# Patient Record
Sex: MALE | Race: BLACK OR AFRICAN AMERICAN | NOT HISPANIC OR LATINO | URBAN - METROPOLITAN AREA
[De-identification: names, ages, dates, MRNs, and addresses within clinical notes are randomized per-mention and may not be internally consistent; named-entity substitution may affect disease eponyms.]

---

## 2022-05-19 ENCOUNTER — EMERGENCY (EMERGENCY)
Facility: HOSPITAL | Age: 42
LOS: 1 days | Discharge: HOME | End: 2022-05-19
Attending: EMERGENCY MEDICINE
Payer: SELF-PAY

## 2022-05-19 VITALS
DIASTOLIC BLOOD PRESSURE: 79 MMHG | OXYGEN SATURATION: 99 % | SYSTOLIC BLOOD PRESSURE: 144 MMHG | HEART RATE: 97 BPM | RESPIRATION RATE: 18 BRPM | TEMPERATURE: 98 F

## 2022-05-19 VITALS
WEIGHT: 244.93 LBS | TEMPERATURE: 99 F | HEART RATE: 98 BPM | RESPIRATION RATE: 18 BRPM | HEIGHT: 70 IN | SYSTOLIC BLOOD PRESSURE: 156 MMHG | OXYGEN SATURATION: 96 % | DIASTOLIC BLOOD PRESSURE: 108 MMHG

## 2022-05-19 PROCEDURE — 73564 X-RAY EXAM KNEE 4 OR MORE: CPT

## 2022-05-19 PROCEDURE — 73564 X-RAY EXAM KNEE 4 OR MORE: CPT | Mod: 26,LT

## 2022-05-19 PROCEDURE — 99284 EMERGENCY DEPT VISIT MOD MDM: CPT

## 2022-05-19 PROCEDURE — 99283 EMERGENCY DEPT VISIT LOW MDM: CPT | Mod: 25

## 2022-05-19 RX ORDER — IBUPROFEN 200 MG
800 TABLET ORAL ONCE
Refills: 0 | Status: COMPLETED | OUTPATIENT
Start: 2022-05-19 | End: 2022-05-19

## 2022-05-19 RX ORDER — IBUPROFEN 200 MG
1 TABLET ORAL
Qty: 20 | Refills: 0
Start: 2022-05-19

## 2022-05-19 RX ADMIN — Medication 800 MILLIGRAM(S): at 12:32

## 2022-05-19 NOTE — ED PROVIDER NOTE - PATIENT PORTAL LINK FT
You can access the FollowMyHealth Patient Portal offered by [facility] by registering at the following website: [email or web address]. By joining [employer]’s FollowMyHealth portal, you will also be able to view your health information using other applications (apps) compatible with our system.
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
## 2022-05-19 NOTE — ED PROVIDER NOTE - NSFOLLOWUPINSTRUCTIONS_ED_ALL_ED_FT
Knee Sprain, Adult       A knee sprain is a stretch or tear in a knee ligament. Knee ligaments are tissues that connect bones in the knee to each other.      What are the causes?    This condition often results from:  •A fall.      •An injury to the knee.        What are the signs or symptoms?    Symptoms of this condition include:  •Trouble straightening or bending the leg.      •Swelling in the knee.      •Bruising around the knee.      •Tenderness or pain in the knee.      •Muscle spasms around the knee.        How is this diagnosed?    This condition may be diagnosed based on:  •A physical exam.      •A history of what happened just before you started to have symptoms.    •Tests, including:  •An X-ray. This may be done to make sure no bones are broken.      •An MRI. This may be done to check if the ligament is torn.      •Stress testing of the knee. This may be done to check ligament damage.          How is this treated?    Treatment for this condition may involve:  •Keeping the knee still (immobilized) with a cast, brace, or splint.      •Applying ice to the knee. This helps with pain and swelling.      •Raising (elevating) the knee above the level of your heart when you are resting. This helps with pain and swelling.      •Taking medicine for pain.      •Doing exercises to prevent or limit permanent weakness or stiffness in your knee.      •Having surgery to reconnect the ligament to the bone or to reconstruct it. This may be needed if the ligament is completely torn.        Follow these instructions at home:    If you have a splint or brace:     •Wear it as told by your health care provider. Remove it only as told by your health care provider.      •Check the skin around it every day. Tell your health care provider about any concerns.      •Loosen it if your toes tingle, become numb, or turn cold and blue.      •Keep it clean and dry.      If you have a cast:     • Do not stick anything inside it to scratch your skin. Doing that increases your risk of infection.      •Check the skin around it every day. Tell your health care provider about any concerns.      •You may put lotion on dry skin around the edges of the cast. Do not put lotion on the skin underneath the cast.      •Keep it clean and dry.      Bathing     If you have a splint, brace, or cast that is not waterproof:  •Do not let it get wet.      •Cover it with a watertight covering when you take a bath or a shower.        Managing pain, stiffness, and swelling  •If directed, put ice on the injured area. To do this:  •If you have a removable splint or brace, remove it as told by your health care provider.      •Put ice in a plastic bag.      •Place a towel between your skin and the bag or between your cast and the bag.      •Leave the ice on for 20 minutes, 2–3 times a day.        •Move your toes often to reduce stiffness and swelling.      •Elevate the injured area above the level of your heart while you are sitting or lying down.      General instructions    •Take over-the-counter and prescription medicines only as told by your health care provider.      •Do not use any products that contain nicotine or tobacco, such as cigarettes, e-cigarettes, and chewing tobacco. These can delay healing. If you need help quitting, ask your health care provider.      •Do exercises as told by your health care provider.      •Keep all follow-up visits as told by your health care provider. This is important.        Contact a health care provider if:    •You have pain that gets worse.      •The cast, brace, or splint does not fit right.      •The cast, brace, or splint gets damaged.        Get help right away if:    •You cannot use your injured knee to support any of your body weight (cannot bear weight).      •You cannot move the injured joint.      •You cannot walk more than a few steps without pain or without your knee buckling.      •You have significant pain, swelling, or numbness in the leg below the cast, brace, or splint.      •Your foot or toes are numb, cold, or blue after loosening your splint or brace.        Summary    •A knee sprain is a stretch or tear in a knee ligament that usually occurs as the result of a fall or injury.      •Treatment may involve immobilizing the knee with a cast, splint, or brace and then doing exercises.      •If the ligament is completely torn, it may require surgery to repair or replace the injured ligament.      This information is not intended to replace advice given to you by your health care provider. Make sure you discuss any questions you have with your health care provider.      How to Use a Knee Immobilizer       A knee immobilizer is used to support and protect an injured or painful knee. You may also have to wear it after knee surgery. A knee immobilizer keeps your knee from moving or bending while it is healing. Wear the immobilizer as told by your health care provider. Remove it only as told by your health care provider.    In general, your immobilizer should:  •Have straps, hooks, or tapes that fasten snugly around your leg.      •Not feel too tight or too loose.        What are the risks?    Generally, knee immobilizers are safe to wear. However, problems may occur, including:  •Skin irritation. This may lead to an infection, in rare cases.      •Making your condition worse. This could happen if you wear the immobilizer in the wrong way.        How to use a knee immobilizer    Different immobilizers will have different instructions for use. Your health care provider will show you or tell you:  •How to put on the immobilizer.      •How to adjust the immobilizer.      •When and how often to wear the immobilizer.      •How to remove the immobilizer.      •If you will need any assistive devices in addition to the immobilizer, such as crutches or a cane.        Follow these instructions at home:    Managing pain, stiffness, and swelling     •Raise (elevate) the injured area above the level of your heart while you are sitting or lying down. Doing this reduces throbbing and helps with healing. Pillows can be used for support.    •Loosen the immobilizer if your toes tingle or if you notice other symptoms or signs that it is too tight, such as:  •Swelling.      •Numbness.      •Color change in your foot or ankle.      •More pain.        Bathing   •If the immobilizer is not waterproof:  •Do not let it get wet.      •Cover it with a watertight covering when you take a bath or a shower.      •If your health care provider says that you may remove the immobilizer:  •Take it off before bathing or showering.      •Check for any skin irritation.      •Use a towel to dry the area completely before you put the immobilizer back on.        Infection signs     Check any irritations, incisions, or cuts on your skin under the immobilizer every day for signs of infection. Check for:  •More redness, swelling, or pain.      •Fluid or blood.      •Warmth.      •Pus or a bad smell.      General instructions    •Keep the immobilizer clean and dry.      •Return to your normal activities as told by your health care provider. Ask your health care provider what activities are safe for you.      •Check the skin around the immobilizer every day. Tell your health care provider about any concerns.      •Follow your health care provider's instructions about whether you can put any weight on your injured leg. Use crutches or a cane as told by your health care provider.      •Keep all follow-up visits as told by your health care provider. This is important.        Contact a health care provider if:    •Your knee immobilizer breaks or needs to be replaced.      •You have more pain or swelling in your knee, foot, or ankle.      •Your knee immobilizer is not helping.      •Your knee immobilizer makes your knee pain worse.        Summary    •A knee immobilizer is used to support and protect an injured or painful knee. You may also have to wear it after knee surgery.      •Generally, knee immobilizers are safe to wear.      •Different immobilizers will have different instructions for use. Follow the instructions from your health care provider.      •Contact your health care provider if you have more swelling or pain, if your knee immobilizer breaks, or if your knee immobilizer does not help your knee pain.      This information is not intended to replace advice given to you by your health care provider. Make sure you discuss any questions you have with your health care provider.

## 2024-05-20 NOTE — ED ADULT NURSE NOTE - NURSING NEURO ORIENTATION
Per Antony:  Yes and discontinued Januvia     Januvia has been discontinued off patient's medication list and Ozempic sent in.     Writer left patient a detailed message advising on the 0.25 mg dose once weekly for 4 weeks and then to call us towards the end of the 4 weeks so we can discuss the next dosage increase. Also advised him that once he starts the Ozempic he should discontinue the Januvia. Advised to call us back with any questions/concerns.    oriented to person, place and time